# Patient Record
Sex: FEMALE | Race: WHITE | NOT HISPANIC OR LATINO | Employment: UNEMPLOYED | URBAN - METROPOLITAN AREA
[De-identification: names, ages, dates, MRNs, and addresses within clinical notes are randomized per-mention and may not be internally consistent; named-entity substitution may affect disease eponyms.]

---

## 2017-06-13 ENCOUNTER — TELEPHONE (OUTPATIENT)
Dept: OBSTETRICS AND GYNECOLOGY | Facility: CLINIC | Age: 22
End: 2017-06-13

## 2017-06-13 NOTE — TELEPHONE ENCOUNTER
----- Message from Jennie García MA sent at 6/13/2017 10:25 AM EDT -----  Contact: pt  Peg,    She had an US with [facility], did not show an IUD in place. If she is not having any problems, I would suggest her keep her scheduled appt.    Jennie  ----- Message -----     From: Peg Chávez     Sent: 6/12/2017  12:03 PM       To: Jennie García MA    OE Patient called stating she was seen at [facility] rec) due to abdominal pain and was diagnosed being pregnant but is unsure if she still has the Mirena. Can we see her sooner or is it okay to wait till her upcoming appt? Please advise. Pt # [phone]

## 2017-06-23 ENCOUNTER — INITIAL PRENATAL (OUTPATIENT)
Dept: OBSTETRICS AND GYNECOLOGY | Facility: CLINIC | Age: 22
End: 2017-06-23

## 2017-06-23 VITALS
SYSTOLIC BLOOD PRESSURE: 123 MMHG | HEIGHT: 64 IN | WEIGHT: 234 LBS | BODY MASS INDEX: 39.95 KG/M2 | DIASTOLIC BLOOD PRESSURE: 71 MMHG

## 2017-06-23 DIAGNOSIS — O26.841 UTERINE SIZE DATE DISCREPANCY PREGNANCY, FIRST TRIMESTER: ICD-10-CM

## 2017-06-23 DIAGNOSIS — O34.219 PREVIOUS CESAREAN DELIVERY, ANTEPARTUM: ICD-10-CM

## 2017-06-23 DIAGNOSIS — Z78.9 VARICELLA VACCINATION STATUS UNKNOWN: ICD-10-CM

## 2017-06-23 DIAGNOSIS — Z11.3 SCREEN FOR STD (SEXUALLY TRANSMITTED DISEASE): ICD-10-CM

## 2017-06-23 DIAGNOSIS — O23.41 UTI IN PREGNANCY, ANTEPARTUM, FIRST TRIMESTER: ICD-10-CM

## 2017-06-23 DIAGNOSIS — Z12.4 SCREENING FOR CERVICAL CANCER: ICD-10-CM

## 2017-06-23 DIAGNOSIS — O99.331 TOBACCO USE IN PREGNANCY, ANTEPARTUM, FIRST TRIMESTER: ICD-10-CM

## 2017-06-23 DIAGNOSIS — O99.211 OBESITY IN PREGNANCY, ANTEPARTUM, FIRST TRIMESTER: ICD-10-CM

## 2017-06-23 DIAGNOSIS — Z13.71 SCREENING FOR GENETIC DISEASE CARRIER STATUS: ICD-10-CM

## 2017-06-23 DIAGNOSIS — Z34.81 ENCOUNTER FOR SUPERVISION OF OTHER NORMAL PREGNANCY IN FIRST TRIMESTER: Primary | ICD-10-CM

## 2017-06-23 DIAGNOSIS — O21.0 HYPEREMESIS GRAVIDARUM: ICD-10-CM

## 2017-06-23 PROCEDURE — 99406 BEHAV CHNG SMOKING 3-10 MIN: CPT | Performed by: OBSTETRICS & GYNECOLOGY

## 2017-06-23 PROCEDURE — 99214 OFFICE O/P EST MOD 30 MIN: CPT | Performed by: OBSTETRICS & GYNECOLOGY

## 2017-06-23 RX ORDER — NITROFURANTOIN 25; 75 MG/1; MG/1
100 CAPSULE ORAL 2 TIMES DAILY
Qty: 14 CAPSULE | Refills: 0 | Status: SHIPPED | OUTPATIENT
Start: 2017-06-23 | End: 2017-06-30

## 2017-06-23 RX ORDER — PROMETHAZINE HYDROCHLORIDE 25 MG/1
25 TABLET ORAL EVERY 6 HOURS PRN
Qty: 30 TABLET | Refills: 2 | Status: SHIPPED | OUTPATIENT
Start: 2017-06-23 | End: 2017-11-17

## 2017-06-23 RX ORDER — MAGNESIUM HYDROXIDE 400 MG/5ML
1 SUSPENSION, ORAL (FINAL DOSE FORM) ORAL DAILY
Qty: 30 CAPSULE | Refills: 11 | Status: SHIPPED | OUTPATIENT
Start: 2017-06-23 | End: 2017-07-23

## 2017-06-23 RX ORDER — DOXYLAMINE SUCCINATE AND PYRIDOXINE HYDROCHLORIDE, DELAYED RELEASE TABLETS 10 MG/10 MG 10; 10 MG/1; MG/1
2 TABLET, DELAYED RELEASE ORAL NIGHTLY PRN
Qty: 60 TABLET | Refills: 3 | Status: SHIPPED | OUTPATIENT
Start: 2017-06-23 | End: 2017-11-17

## 2017-06-23 NOTE — PROGRESS NOTES
Initial ob visit     CC- Here for care of pregnancy     Liz Butcher is being seen today for her first obstetrical visit.  She is a 22 y.o.    11w5d gestation.     #: 1, Date: 13, Sex: Male, Weight: 8 lb 7 oz (3.827 kg), GA: 39w0d, Delivery: , Unspecified, Apgar1: None, Apgar5: None, Living: Yes, Birth Comments: None    #: 2, Date: 06/05/15, Sex: Female, Weight: 8 lb 3 oz (3.714 kg), GA: 39w0d, Delivery: , Unspecified, Apgar1: None, Apgar5: None, Living: Yes, Birth Comments: None    #: 3, Date: None, Sex: None, Weight: None, GA: None, Delivery: None, Apgar1: None, Apgar5: None, Living: None, Birth Comments: None      Current obstetric complaints : none   Prior obstetric issues, potential pregnancy concerns:  x 2  Family history of genetic issues (includes FOB): none  Prior infections concerning in pregnancy (Rash, fever in last 2 weeks): none  Varicella Hx -negative history  Prior testing for Cystic Fibrosis Carrier or Sickle Cell Trait- unknown status  Prepregnancy BMI - Body mass index is 40.17 kg/(m^2).    History reviewed. No pertinent past medical history.    Past Surgical History:   Procedure Laterality Date   •  SECTION     • WISDOM TOOTH EXTRACTION         No current outpatient prescriptions on file.    No Known Allergies    Social History     Social History   • Marital status: Single     Spouse name: N/A   • Number of children: N/A   • Years of education: N/A     Occupational History   • Not on file.     Social History Main Topics   • Smoking status: Current Some Day Smoker     Types: Cigarettes   • Smokeless tobacco: Never Used   • Alcohol use No   • Drug use: No   • Sexual activity: Yes     Partners: Male     Other Topics Concern   • Not on file     Social History Narrative   • No narrative on file       Family History   Problem Relation Age of Onset   • Breast cancer Neg Hx    • Ovarian cancer Neg Hx    • Uterine cancer Neg Hx    • Colon cancer Neg Hx   "  • Deep vein thrombosis Neg Hx    • Pulmonary embolism Neg Hx        Review of systems     A comprehensive review of systems was negative.     Objective    /71  Ht 64\" (162.6 cm)  Wt 234 lb (106 kg)  LMP 2017 (Exact Date)  BMI 40.17 kg/m2      General Appearance:    Alert, cooperative, in no acute distress, habitus obese   Head:    Normocephalic, without obvious abnormality, atraumatic   Eyes:            Lids and lashes normal, conjunctivae and sclerae normal, no   icterus, no pallor, corneas clear   Ears:    Ears appear intact with no abnormalities noted       Neck:   No adenopathy, supple, trachea midline, no thyromegaly   Back:     No kyphosis present, no scoliosis present,                       Lungs:     Clear to auscultation,respirations regular, even and                   unlabored    Heart:    Regular rhythm and normal rate, normal S1 and S2, no            murmur, no gallop, no rub, no click   Breast Exam:    No masses, No nipple discharge   Abdomen:     Normal bowel sounds, no masses, no organomegaly, soft        non-tender, non-distended, no guarding, no rebound                 tenderness   Genitalia:    Vulva - BUS-WNL, NEFG    Vagina - No discharge, No bleeding    Cervix - No Lesions, closed     Uterus - Consistent with 9 weeks    Adnexa - No vicente, NT    Pelvimetry - clinically adequate, gynecoid pelvis     Extremities:   Moves all extremities well, no edema, no cyanosis, no              redness   Pulses:   Pulses palpable and equal bilaterally   Skin:   No bleeding, bruising or rash   Lymph nodes:   No palpable adenopathy   Neurologic:   Sensation intact, A&O times 3      Assessment    1) Pregnancy at 11w5d  2) Obesity in pregnancy   Limit weight gain to 10-20 # overall  3) Tobacco use in pregnancy  I spent > 3 minutes counseling to stop smoking for the health and wellbeing of her pregnancy   4) Previous   Repeat at delivery   5) Hyperemesis, Rx sent        Plan    Initial " labs drawn, GC/CHL screen done  Patient is on Prenatal vitamins  Problem list reviewed and updated.  Reviewed routine prenatal care with the office to include but not limited to   Zika (travel restrictions/ok to use insect repellant), not to changing cat litter, food restrictions, avoidance of alcohol, tobacco and drugs and saunas/hot tubs.   All questions answered.     Uziel Fletcher MD   6/23/2017  9:58 AM

## 2017-06-24 LAB
ABO GROUP BLD: (no result)
BASOPHILS # BLD AUTO: 0 X10E3/UL (ref 0–0.2)
BASOPHILS NFR BLD AUTO: 0 %
BLD GP AB SCN SERPL QL: NEGATIVE
EOSINOPHIL # BLD AUTO: 0.1 X10E3/UL (ref 0–0.4)
EOSINOPHIL NFR BLD AUTO: 2 %
ERYTHROCYTE [DISTWIDTH] IN BLOOD BY AUTOMATED COUNT: 13.8 % (ref 12.3–15.4)
HBV SURFACE AG SERPL QL IA: NEGATIVE
HCT VFR BLD AUTO: 38.2 % (ref 34–46.6)
HGB BLD-MCNC: 12.9 G/DL (ref 11.1–15.9)
HIV 1+2 AB+HIV1 P24 AG SERPL QL IA: NON REACTIVE
IMM GRANULOCYTES # BLD: 0 X10E3/UL (ref 0–0.1)
IMM GRANULOCYTES NFR BLD: 0 %
LYMPHOCYTES # BLD AUTO: 1.7 X10E3/UL (ref 0.7–3.1)
LYMPHOCYTES NFR BLD AUTO: 24 %
MCH RBC QN AUTO: 28.6 PG (ref 26.6–33)
MCHC RBC AUTO-ENTMCNC: 33.8 G/DL (ref 31.5–35.7)
MCV RBC AUTO: 85 FL (ref 79–97)
MONOCYTES # BLD AUTO: 0.5 X10E3/UL (ref 0.1–0.9)
MONOCYTES NFR BLD AUTO: 8 %
NEUTROPHILS # BLD AUTO: 4.8 X10E3/UL (ref 1.4–7)
NEUTROPHILS NFR BLD AUTO: 66 %
PLATELET # BLD AUTO: 244 X10E3/UL (ref 150–379)
RBC # BLD AUTO: 4.51 X10E6/UL (ref 3.77–5.28)
RH BLD: POSITIVE
RPR SER QL: NON REACTIVE
RUBV IGG SERPL IA-ACNC: 1.11 INDEX
VZV IGG SER IA-ACNC: 283 INDEX
WBC # BLD AUTO: 7.2 X10E3/UL (ref 3.4–10.8)

## 2017-06-25 LAB
BACTERIA UR CULT: NORMAL
BACTERIA UR CULT: NORMAL

## 2017-06-27 LAB
C TRACH RRNA SPEC QL NAA+PROBE: NEGATIVE
N GONORRHOEA RRNA SPEC QL NAA+PROBE: NEGATIVE
T VAGINALIS RRNA SPEC QL NAA+PROBE: NEGATIVE

## 2017-06-28 LAB
CONV .: NORMAL
CYTOLOGIST CVX/VAG CYTO: NORMAL
CYTOLOGY CVX/VAG DOC THIN PREP: NORMAL
DX ICD CODE: NORMAL
DX ICD CODE: NORMAL
HIV 1 & 2 AB SER-IMP: NORMAL
Lab: NORMAL
OTHER STN SPEC: NORMAL
PATH REPORT.FINAL DX SPEC: NORMAL
STAT OF ADQ CVX/VAG CYTO-IMP: NORMAL

## 2017-06-30 ENCOUNTER — PROCEDURE VISIT (OUTPATIENT)
Dept: OBSTETRICS AND GYNECOLOGY | Facility: CLINIC | Age: 22
End: 2017-06-30

## 2017-06-30 DIAGNOSIS — Z34.91 UNCERTAIN DATES, ANTEPARTUM, FIRST TRIMESTER: Primary | ICD-10-CM

## 2017-06-30 LAB
CFTR MUT ANL BLD/T: NORMAL
CONV COMMENT: NORMAL

## 2017-06-30 PROCEDURE — 76817 TRANSVAGINAL US OBSTETRIC: CPT | Performed by: OBSTETRICS & GYNECOLOGY

## 2017-07-24 ENCOUNTER — TELEPHONE (OUTPATIENT)
Dept: OBSTETRICS AND GYNECOLOGY | Facility: CLINIC | Age: 22
End: 2017-07-24

## 2017-08-03 ENCOUNTER — TELEPHONE (OUTPATIENT)
Dept: OBSTETRICS AND GYNECOLOGY | Facility: CLINIC | Age: 22
End: 2017-08-03

## 2017-11-16 ENCOUNTER — TELEPHONE (OUTPATIENT)
Dept: OBSTETRICS AND GYNECOLOGY | Facility: CLINIC | Age: 22
End: 2017-11-16

## 2017-11-16 NOTE — TELEPHONE ENCOUNTER
Jennie,     We have seen before - so that is fine.     Thanks   Dr. Fletcher    ----- Message from Peg Chávez sent at 11/16/2017 10:33 AM EST -----  OB patient called stating she has recently moved back to [place] and is wanting to come back to see you. I have contacted her previous OB in [place] to get records. I scheduled her for tomorrow but patient is aware that you have to give the final okay for her to return.     Please advise    PT# is [phone]

## 2017-11-16 NOTE — TELEPHONE ENCOUNTER
Pt has an appt 11/17/2017. Pt advised to keep this appt. She did have 2 no-shows and no followup after we tried contacting her. Her pregnancy has been uneventful thus far.     Jennie

## 2017-11-17 ENCOUNTER — ROUTINE PRENATAL (OUTPATIENT)
Dept: OBSTETRICS AND GYNECOLOGY | Facility: CLINIC | Age: 22
End: 2017-11-17

## 2017-11-17 VITALS — BODY MASS INDEX: 41.2 KG/M2 | DIASTOLIC BLOOD PRESSURE: 74 MMHG | SYSTOLIC BLOOD PRESSURE: 128 MMHG | WEIGHT: 240 LBS

## 2017-11-17 DIAGNOSIS — Z34.83 ENCOUNTER FOR SUPERVISION OF OTHER NORMAL PREGNANCY IN THIRD TRIMESTER: Primary | ICD-10-CM

## 2017-11-17 DIAGNOSIS — O34.219 PREVIOUS CESAREAN DELIVERY, ANTEPARTUM: ICD-10-CM

## 2017-11-17 DIAGNOSIS — O99.210 OBESITY IN PREGNANCY, ANTEPARTUM: ICD-10-CM

## 2017-11-17 DIAGNOSIS — O23.43 UTI IN PREGNANCY, ANTEPARTUM, THIRD TRIMESTER: ICD-10-CM

## 2017-11-17 PROCEDURE — 99214 OFFICE O/P EST MOD 30 MIN: CPT | Performed by: OBSTETRICS & GYNECOLOGY

## 2017-11-17 RX ORDER — NITROFURANTOIN 25; 75 MG/1; MG/1
100 CAPSULE ORAL 2 TIMES DAILY
Qty: 14 CAPSULE | Refills: 0 | Status: SHIPPED | OUTPATIENT
Start: 2017-11-17 | End: 2017-11-24

## 2017-11-17 NOTE — PROGRESS NOTES
OB follow up     Liz Butcher is a 22 y.o.  31w1d being seen today for her obstetrical visit.  Patient reports no complaints. Fetal movement: normal.    Review of Systems  No bleeding, No cramping/contractions     Wt 240 lb (109 kg)  LMP 2017 (Exact Date)  BMI 41.2 kg/m2    FHT: 134 BPM   Uterine Size: size equals dates       Assessment    1) pregnancy at 31w1d   Rh+, BS good   2) UTI in pregnancy, treat with Macrobid, check culture -Nitrites pos, culture sent.  3) Prior    Repeat at 39 weeks  Sign papers  4) Obesity, good weight gain,   Check growth next visit.   5) Insufficient care  One visit with me, 2 outside, at 31+ weeks   Compliance stressed     I spent 25 of 30 minutes face to face with the patient, during this time we reviewed her medical diagnosis and our treatment plan as outlined above.       Plan    Reviewed this stage of pregnancy  Problem list updated   Follow up in 2 weeks    Uziel Fletcher MD   2017  12:01 PM

## 2017-11-19 LAB
BACTERIA UR CULT: NORMAL
BACTERIA UR CULT: NORMAL

## 2017-12-04 ENCOUNTER — ROUTINE PRENATAL (OUTPATIENT)
Dept: OBSTETRICS AND GYNECOLOGY | Facility: CLINIC | Age: 22
End: 2017-12-04

## 2017-12-04 ENCOUNTER — PROCEDURE VISIT (OUTPATIENT)
Dept: OBSTETRICS AND GYNECOLOGY | Facility: CLINIC | Age: 22
End: 2017-12-04

## 2017-12-04 VITALS — WEIGHT: 241 LBS | DIASTOLIC BLOOD PRESSURE: 75 MMHG | SYSTOLIC BLOOD PRESSURE: 118 MMHG | BODY MASS INDEX: 41.37 KG/M2

## 2017-12-04 DIAGNOSIS — O34.219 PREVIOUS CESAREAN DELIVERY, ANTEPARTUM: ICD-10-CM

## 2017-12-04 DIAGNOSIS — O36.63X0 EXCESSIVE FETAL GROWTH AFFECTING MANAGEMENT OF PREGNANCY IN THIRD TRIMESTER, SINGLE OR UNSPECIFIED FETUS: ICD-10-CM

## 2017-12-04 DIAGNOSIS — Z34.83 ENCOUNTER FOR SUPERVISION OF OTHER NORMAL PREGNANCY IN THIRD TRIMESTER: Primary | ICD-10-CM

## 2017-12-04 DIAGNOSIS — O26.843 UTERINE SIZE DATE DISCREPANCY PREGNANCY, THIRD TRIMESTER: Primary | ICD-10-CM

## 2017-12-04 PROCEDURE — 99213 OFFICE O/P EST LOW 20 MIN: CPT | Performed by: OBSTETRICS & GYNECOLOGY

## 2017-12-04 PROCEDURE — 76816 OB US FOLLOW-UP PER FETUS: CPT | Performed by: OBSTETRICS & GYNECOLOGY

## 2017-12-04 NOTE — PROGRESS NOTES
OB follow up     Liz Butcher is a 22 y.o.  33w4d being seen today for her obstetrical visit.  Patient reports no complaints. Fetal movement: normal.    Review of Systems  No bleeding, No cramping/contractions     /75  Wt 241 lb (109 kg)  LMP 2017 (Exact Date)  BMI 41.37 kg/m2    FHT: 143 BPM   Uterine Size: size equals dates       Assessment    1) pregnancy at 33w4d   2) LGA 69%, A/C 98%, Normal BIANCA and cephalic   3) Prior , for repeat at 39 weeks  4) insufficient care, followed up today as should have.         Plan    Reviewed this stage of pregnancy  Problem list updated   Follow up in 2 weeks    Uziel Fletcher MD   2017  3:51 PM

## 2017-12-08 ENCOUNTER — TELEPHONE (OUTPATIENT)
Dept: OBSTETRICS AND GYNECOLOGY | Facility: CLINIC | Age: 22
End: 2017-12-08

## 2017-12-09 ENCOUNTER — HOSPITAL ENCOUNTER (OUTPATIENT)
Facility: HOSPITAL | Age: 22
Setting detail: OBSERVATION
Discharge: HOME | End: 2017-12-09
Attending: OBSTETRICS & GYNECOLOGY | Admitting: OBSTETRICS & GYNECOLOGY

## 2017-12-09 VITALS
DIASTOLIC BLOOD PRESSURE: 44 MMHG | SYSTOLIC BLOOD PRESSURE: 119 MMHG | BODY MASS INDEX: 42.51 KG/M2 | WEIGHT: 249 LBS | RESPIRATION RATE: 16 BRPM | HEIGHT: 64 IN | HEART RATE: 80 BPM | TEMPERATURE: 98 F

## 2017-12-09 PROBLEM — Z34.90 PREGNANCY: Status: ACTIVE | Noted: 2017-12-09

## 2017-12-09 LAB
EXPIRATION DATE: NORMAL
Lab: NORMAL
PROT UR STRIP-MCNC: NEGATIVE MG/DL

## 2017-12-09 PROCEDURE — G0378 HOSPITAL OBSERVATION PER HR: HCPCS

## 2017-12-09 PROCEDURE — 96374 THER/PROPH/DIAG INJ IV PUSH: CPT

## 2017-12-09 PROCEDURE — 81002 URINALYSIS NONAUTO W/O SCOPE: CPT | Performed by: OBSTETRICS & GYNECOLOGY

## 2017-12-09 PROCEDURE — 59025 FETAL NON-STRESS TEST: CPT

## 2017-12-09 PROCEDURE — 59025 FETAL NON-STRESS TEST: CPT | Performed by: OBSTETRICS & GYNECOLOGY

## 2017-12-09 RX ORDER — FAMOTIDINE 10 MG/ML
20 INJECTION, SOLUTION INTRAVENOUS ONCE
Status: COMPLETED | OUTPATIENT
Start: 2017-12-09 | End: 2017-12-09

## 2017-12-09 RX ORDER — PROMETHAZINE HYDROCHLORIDE 25 MG/1
25 TABLET ORAL EVERY 6 HOURS PRN
COMMUNITY
End: 2017-12-18

## 2017-12-09 RX ORDER — PRENATAL VIT NO.126/IRON/FOLIC 28MG-0.8MG
1 TABLET ORAL DAILY
COMMUNITY
End: 2020-09-09

## 2017-12-09 RX ADMIN — FAMOTIDINE 20 MG: 10 INJECTION INTRAVENOUS at 03:11

## 2017-12-09 NOTE — NON STRESS TEST
Liz Butcher, a  at 34w2d with an EMELY of 2018, by Ultrasound, was seen at [facility] for a nonstress test.    Chief Complaint   Patient presents with   • Abdominal Pain     Patient presents to triage this morning via EMS, transferred from [facility] [place] [place], with c/o abdominal pain that started yesterday morning (2017). Patient states that it is intermittent and feels like pains she gets when she is constipated. Patient rates pain as a 9 or 10 on a 0-10 pain scale. Patient denies any LOF or vaginal bleeding and reports good fetal movement. Patients last bowel movement was 2017.   • Vomiting     Patient also c/o nausea and vomiting which started Monday (2017). She has not been able to keep anything down but ice chips.       Interpretation A  Nonstress Test Interpretation A: Reactive (17 0300 : Marielos Oreilly RN)

## 2017-12-09 NOTE — TELEPHONE ENCOUNTER
Received call from ED that patient presented to [facility] with complaints of contractions. States that she went there because she was uncertain her car would make it to [facility].  They have auscultated fetal heart tones on the baby and report her cervical exam is stable for transfer. They do not have continuous fetal monitoring. She had two prior  sections.  Will be transferred to ED for triage evaluation.  L&D notified.

## 2017-12-09 NOTE — NURSING NOTE
D/C instructions explained, signed and copy given. All questions answered and patient has no other questions or concerns at this time. Discussed signs and symptoms of when to return to triage, as well as instructed patient on fetal kick counts and log. Patient verbally understands. Patient states has all belongings.

## 2017-12-18 ENCOUNTER — ROUTINE PRENATAL (OUTPATIENT)
Dept: OBSTETRICS AND GYNECOLOGY | Facility: CLINIC | Age: 22
End: 2017-12-18

## 2017-12-18 VITALS — DIASTOLIC BLOOD PRESSURE: 66 MMHG | BODY MASS INDEX: 41.71 KG/M2 | SYSTOLIC BLOOD PRESSURE: 109 MMHG | WEIGHT: 243 LBS

## 2017-12-18 DIAGNOSIS — Z34.83 ENCOUNTER FOR SUPERVISION OF OTHER NORMAL PREGNANCY IN THIRD TRIMESTER: Primary | ICD-10-CM

## 2017-12-18 DIAGNOSIS — Z36.9 ANTENATAL SCREENING ENCOUNTER: ICD-10-CM

## 2017-12-18 DIAGNOSIS — O34.219 PREVIOUS CESAREAN DELIVERY, ANTEPARTUM: ICD-10-CM

## 2017-12-18 LAB — EXTERNAL GROUP B STREP ANTIGEN: NORMAL

## 2017-12-18 PROCEDURE — 99213 OFFICE O/P EST LOW 20 MIN: CPT | Performed by: OBSTETRICS & GYNECOLOGY

## 2017-12-18 NOTE — PROGRESS NOTES
Ob follow up    Liz Butcher is a 22 y.o.  35w4d patient being seen today for her obstetrical visit. Patient reports no complaints. Fetal movement: normal.      ROS - Denies leaking fluid, vaginal bleeding and notes good fetal movement.     /66  Wt 110 kg (243 lb)  LMP 2017 (Exact Date)  BMI 41.71 kg/m2    FHT:  154 BPM    Uterine Size: size equals dates   Presentations: cephalic   Pelvic Exam:     Dilation: Closed    Effacement: 25%    Station:  -2                 Assessment    1) Pregnancy at 35w4d  2) Fetal status reassuring   3) GBS status - done today   4) Prior  - repeat 18  5) LGA/insufficient care       Plan  GBS today  Labor warnings   [facility]        Uziel Fletcher MD   2017  3:14 PM

## 2017-12-22 LAB — B-HEM STREP SPEC QL CULT: NEGATIVE

## 2017-12-27 ENCOUNTER — ROUTINE PRENATAL (OUTPATIENT)
Dept: OBSTETRICS AND GYNECOLOGY | Facility: CLINIC | Age: 22
End: 2017-12-27

## 2017-12-27 VITALS — DIASTOLIC BLOOD PRESSURE: 72 MMHG | BODY MASS INDEX: 41.88 KG/M2 | WEIGHT: 244 LBS | SYSTOLIC BLOOD PRESSURE: 112 MMHG

## 2017-12-27 DIAGNOSIS — O34.219 PREVIOUS CESAREAN DELIVERY, ANTEPARTUM: ICD-10-CM

## 2017-12-27 DIAGNOSIS — Z34.83 ENCOUNTER FOR SUPERVISION OF OTHER NORMAL PREGNANCY IN THIRD TRIMESTER: Primary | ICD-10-CM

## 2017-12-27 PROCEDURE — 99213 OFFICE O/P EST LOW 20 MIN: CPT | Performed by: OBSTETRICS & GYNECOLOGY

## 2017-12-27 NOTE — PROGRESS NOTES
Ob follow up    Liz Butcher is a 22 y.o.  36w6d patient being seen today for her obstetrical visit. Patient reports heartburn. Fetal movement: normal.      ROS - Denies leaking fluid, vaginal bleeding and notes good fetal movement.     /72  Wt 111 kg (244 lb)  LMP 2017 (Exact Date)  BMI 41.88 kg/m2    FHT:  146 BPM    Uterine Size: size equals dates   Presentations: cephalic   Pelvic Exam:     Dilation: Closed    Effacement: 25%    Station:  -2                 Assessment    1) Pregnancy at 36w6d  2) Fetal status reassuring   3) GBS status - negative   4) prior  - repeat scheduled  5) LGA       Plan    Labor warnings   [facility]        Uziel Fletcher MD   2017  4:10 PM

## 2018-01-10 ENCOUNTER — ROUTINE PRENATAL (OUTPATIENT)
Dept: OBSTETRICS AND GYNECOLOGY | Facility: CLINIC | Age: 23
End: 2018-01-10

## 2018-01-10 VITALS — DIASTOLIC BLOOD PRESSURE: 84 MMHG | WEIGHT: 245 LBS | BODY MASS INDEX: 42.05 KG/M2 | SYSTOLIC BLOOD PRESSURE: 139 MMHG

## 2018-01-10 DIAGNOSIS — O36.63X0 EXCESSIVE FETAL GROWTH AFFECTING MANAGEMENT OF PREGNANCY IN THIRD TRIMESTER, SINGLE OR UNSPECIFIED FETUS: ICD-10-CM

## 2018-01-10 DIAGNOSIS — Z34.83 ENCOUNTER FOR SUPERVISION OF OTHER NORMAL PREGNANCY IN THIRD TRIMESTER: Primary | ICD-10-CM

## 2018-01-10 DIAGNOSIS — O34.219 PREVIOUS CESAREAN DELIVERY, ANTEPARTUM: ICD-10-CM

## 2018-01-10 PROCEDURE — 99213 OFFICE O/P EST LOW 20 MIN: CPT | Performed by: OBSTETRICS & GYNECOLOGY

## 2018-01-10 NOTE — PROGRESS NOTES
Ob follow up    Liz Butcher is a 22 y.o.  38w6d patient being seen today for her obstetrical visit. Patient reports heartburn. Fetal movement: normal.      ROS - Denies leaking fluid, vaginal bleeding and notes good fetal movement.     /84  Wt 111 kg (245 lb)  LMP 2017 (Exact Date)  BMI 42.05 kg/m2    FHT:  156 BPM    Uterine Size: 36 cm   Presentations: cephalic   Pelvic Exam:     Dilation: deferred    Effacement: deferred    Station:  deferred                 Assessment    1) Pregnancy at 38w6d  2) Fetal status reassuring   3) GBS status - negative   4) Prior , repeat for AM   5) LGA and Obesity, see above.     Plan    Labor warnings   [facility]        Uziel Fletcher MD   1/10/2018  1:04 PM

## 2018-01-11 ENCOUNTER — HOSPITAL ENCOUNTER (INPATIENT)
Facility: HOSPITAL | Age: 23
LOS: 2 days | Discharge: HOME | End: 2018-01-13
Attending: OBSTETRICS & GYNECOLOGY | Admitting: OBSTETRICS & GYNECOLOGY

## 2018-01-11 ENCOUNTER — ANESTHESIA EVENT (OUTPATIENT)
Dept: LABOR AND DELIVERY | Facility: HOSPITAL | Age: 23
End: 2018-01-11

## 2018-01-11 ENCOUNTER — ANESTHESIA (OUTPATIENT)
Dept: LABOR AND DELIVERY | Facility: HOSPITAL | Age: 23
End: 2018-01-11

## 2018-01-11 DIAGNOSIS — O34.219 PREVIOUS CESAREAN DELIVERY, ANTEPARTUM: ICD-10-CM

## 2018-01-11 DIAGNOSIS — Z30.2 REQUEST FOR STERILIZATION: ICD-10-CM

## 2018-01-11 LAB
ABO GROUP BLD: NORMAL
BASOPHILS # BLD AUTO: 0.03 10*3/MM3 (ref 0–0.2)
BASOPHILS NFR BLD AUTO: 0.3 % (ref 0–1.5)
BLD GP AB SCN SERPL QL: NEGATIVE
DEPRECATED RDW RBC AUTO: 43.7 FL (ref 37–54)
EOSINOPHIL # BLD AUTO: 0.09 10*3/MM3 (ref 0–0.7)
EOSINOPHIL NFR BLD AUTO: 1 % (ref 0.3–6.2)
ERYTHROCYTE [DISTWIDTH] IN BLOOD BY AUTOMATED COUNT: 14.9 % (ref 11.7–13)
HCT VFR BLD AUTO: 35 % (ref 35.6–45.5)
HGB BLD-MCNC: 11.2 G/DL (ref 11.9–15.5)
IMM GRANULOCYTES # BLD: 0.02 10*3/MM3 (ref 0–0.03)
IMM GRANULOCYTES NFR BLD: 0.2 % (ref 0–0.5)
LYMPHOCYTES # BLD AUTO: 2.27 10*3/MM3 (ref 0.9–4.8)
LYMPHOCYTES NFR BLD AUTO: 25.5 % (ref 19.6–45.3)
MCH RBC QN AUTO: 25.9 PG (ref 26.9–32)
MCHC RBC AUTO-ENTMCNC: 32 G/DL (ref 32.4–36.3)
MCV RBC AUTO: 80.8 FL (ref 80.5–98.2)
MONOCYTES # BLD AUTO: 0.71 10*3/MM3 (ref 0.2–1.2)
MONOCYTES NFR BLD AUTO: 8 % (ref 5–12)
NEUTROPHILS # BLD AUTO: 5.78 10*3/MM3 (ref 1.9–8.1)
NEUTROPHILS NFR BLD AUTO: 65 % (ref 42.7–76)
NRBC BLD MANUAL-RTO: 0 /100 WBC (ref 0–0)
PLATELET # BLD AUTO: 241 10*3/MM3 (ref 140–500)
PMV BLD AUTO: 11.9 FL (ref 6–12)
RBC # BLD AUTO: 4.33 10*6/MM3 (ref 3.9–5.2)
RH BLD: POSITIVE
WBC NRBC COR # BLD: 8.9 10*3/MM3 (ref 4.5–10.7)

## 2018-01-11 PROCEDURE — 0UB70ZZ EXCISION OF BILATERAL FALLOPIAN TUBES, OPEN APPROACH: ICD-10-PCS | Performed by: OBSTETRICS & GYNECOLOGY

## 2018-01-11 PROCEDURE — 25010000002 ONDANSETRON PER 1 MG: Performed by: ANESTHESIOLOGY

## 2018-01-11 PROCEDURE — 25010000003 MORPHINE PER 10 MG: Performed by: NURSE ANESTHETIST, CERTIFIED REGISTERED

## 2018-01-11 PROCEDURE — 25010000002 MORPHINE PER 10 MG: Performed by: NURSE ANESTHETIST, CERTIFIED REGISTERED

## 2018-01-11 PROCEDURE — 86901 BLOOD TYPING SEROLOGIC RH(D): CPT | Performed by: OBSTETRICS & GYNECOLOGY

## 2018-01-11 PROCEDURE — 25010000002 DIPHENHYDRAMINE PER 50 MG: Performed by: NURSE ANESTHETIST, CERTIFIED REGISTERED

## 2018-01-11 PROCEDURE — 86850 RBC ANTIBODY SCREEN: CPT | Performed by: OBSTETRICS & GYNECOLOGY

## 2018-01-11 PROCEDURE — 85025 COMPLETE CBC W/AUTO DIFF WBC: CPT | Performed by: OBSTETRICS & GYNECOLOGY

## 2018-01-11 PROCEDURE — 88302 TISSUE EXAM BY PATHOLOGIST: CPT | Performed by: OBSTETRICS & GYNECOLOGY

## 2018-01-11 PROCEDURE — 58611 LIGATE OVIDUCT(S) ADD-ON: CPT | Performed by: OBSTETRICS & GYNECOLOGY

## 2018-01-11 PROCEDURE — 86900 BLOOD TYPING SEROLOGIC ABO: CPT | Performed by: OBSTETRICS & GYNECOLOGY

## 2018-01-11 PROCEDURE — 59514 CESAREAN DELIVERY ONLY: CPT | Performed by: OBSTETRICS & GYNECOLOGY

## 2018-01-11 PROCEDURE — 25010000002 PHENYLEPHRINE PER 1 ML: Performed by: NURSE ANESTHETIST, CERTIFIED REGISTERED

## 2018-01-11 PROCEDURE — 25010000002 GENTAMICIN PER 80 MG: Performed by: OBSTETRICS & GYNECOLOGY

## 2018-01-11 RX ORDER — CARBOPROST TROMETHAMINE 250 UG/ML
250 INJECTION, SOLUTION INTRAMUSCULAR AS NEEDED
Status: DISCONTINUED | OUTPATIENT
Start: 2018-01-11 | End: 2018-01-13 | Stop reason: HOSPADM

## 2018-01-11 RX ORDER — MORPHINE SULFATE 1 MG/ML
INJECTION, SOLUTION EPIDURAL; INTRATHECAL; INTRAVENOUS AS NEEDED
Status: DISCONTINUED | OUTPATIENT
Start: 2018-01-11 | End: 2018-01-11 | Stop reason: SURG

## 2018-01-11 RX ORDER — SIMETHICONE 80 MG
80 TABLET,CHEWABLE ORAL 4 TIMES DAILY PRN
Status: DISCONTINUED | OUTPATIENT
Start: 2018-01-11 | End: 2018-01-13 | Stop reason: HOSPADM

## 2018-01-11 RX ORDER — OXYTOCIN-SODIUM CHLORIDE 0.9% IV SOLN 30 UNIT/500ML 30-0.9/5 UT/ML-%
SOLUTION INTRAVENOUS
Status: COMPLETED
Start: 2018-01-11 | End: 2018-01-11

## 2018-01-11 RX ORDER — DOCUSATE SODIUM 100 MG/1
100 CAPSULE, LIQUID FILLED ORAL 2 TIMES DAILY PRN
Status: DISCONTINUED | OUTPATIENT
Start: 2018-01-11 | End: 2018-01-13 | Stop reason: HOSPADM

## 2018-01-11 RX ORDER — METHYLERGONOVINE MALEATE 0.2 MG/ML
200 INJECTION INTRAVENOUS ONCE AS NEEDED
Status: DISCONTINUED | OUTPATIENT
Start: 2018-01-11 | End: 2018-01-13 | Stop reason: HOSPADM

## 2018-01-11 RX ORDER — OXYTOCIN-SODIUM CHLORIDE 0.9% IV SOLN 30 UNIT/500ML 30-0.9/5 UT/ML-%
999 SOLUTION INTRAVENOUS ONCE
Status: COMPLETED | OUTPATIENT
Start: 2018-01-11 | End: 2018-01-11

## 2018-01-11 RX ORDER — IBUPROFEN 800 MG/1
800 TABLET ORAL EVERY 8 HOURS PRN
Status: DISCONTINUED | OUTPATIENT
Start: 2018-01-11 | End: 2018-01-13 | Stop reason: HOSPADM

## 2018-01-11 RX ORDER — ACETAMINOPHEN 500 MG
1000 TABLET ORAL ONCE
Status: COMPLETED | OUTPATIENT
Start: 2018-01-11 | End: 2018-01-11

## 2018-01-11 RX ORDER — DIPHENHYDRAMINE HCL 25 MG
25 CAPSULE ORAL EVERY 4 HOURS PRN
Status: DISCONTINUED | OUTPATIENT
Start: 2018-01-11 | End: 2018-01-13 | Stop reason: HOSPADM

## 2018-01-11 RX ORDER — SODIUM CHLORIDE, SODIUM LACTATE, POTASSIUM CHLORIDE, CALCIUM CHLORIDE 600; 310; 30; 20 MG/100ML; MG/100ML; MG/100ML; MG/100ML
125 INJECTION, SOLUTION INTRAVENOUS CONTINUOUS
Status: DISCONTINUED | OUTPATIENT
Start: 2018-01-11 | End: 2018-01-11

## 2018-01-11 RX ORDER — MISOPROSTOL 200 UG/1
800 TABLET ORAL AS NEEDED
Status: DISCONTINUED | OUTPATIENT
Start: 2018-01-11 | End: 2018-01-13 | Stop reason: HOSPADM

## 2018-01-11 RX ORDER — MORPHINE SULFATE 0.5 MG/ML
INJECTION, SOLUTION EPIDURAL; INTRATHECAL; INTRAVENOUS AS NEEDED
Status: DISCONTINUED | OUTPATIENT
Start: 2018-01-11 | End: 2018-01-11 | Stop reason: SURG

## 2018-01-11 RX ORDER — SODIUM CHLORIDE 0.9 % (FLUSH) 0.9 %
1-10 SYRINGE (ML) INJECTION AS NEEDED
Status: DISCONTINUED | OUTPATIENT
Start: 2018-01-11 | End: 2018-01-11 | Stop reason: HOSPADM

## 2018-01-11 RX ORDER — HYDROMORPHONE HCL 110MG/55ML
PATIENT CONTROLLED ANALGESIA SYRINGE INTRAVENOUS
Status: DISCONTINUED
Start: 2018-01-11 | End: 2018-01-11 | Stop reason: WASHOUT

## 2018-01-11 RX ORDER — ERYTHROMYCIN 5 MG/G
OINTMENT OPHTHALMIC
Status: DISPENSED
Start: 2018-01-11 | End: 2018-01-11

## 2018-01-11 RX ORDER — HYDROMORPHONE HYDROCHLORIDE 1 MG/ML
0.5 INJECTION, SOLUTION INTRAMUSCULAR; INTRAVENOUS; SUBCUTANEOUS
Status: ACTIVE | OUTPATIENT
Start: 2018-01-11 | End: 2018-01-12

## 2018-01-11 RX ORDER — BUPIVACAINE HYDROCHLORIDE 7.5 MG/ML
INJECTION, SOLUTION EPIDURAL; RETROBULBAR AS NEEDED
Status: DISCONTINUED | OUTPATIENT
Start: 2018-01-11 | End: 2018-01-11 | Stop reason: SURG

## 2018-01-11 RX ORDER — PHYTONADIONE 2 MG/ML
INJECTION, EMULSION INTRAMUSCULAR; INTRAVENOUS; SUBCUTANEOUS
Status: DISPENSED
Start: 2018-01-11 | End: 2018-01-11

## 2018-01-11 RX ORDER — GLYCOPYRROLATE 0.2 MG/ML
INJECTION INTRAMUSCULAR; INTRAVENOUS AS NEEDED
Status: DISCONTINUED | OUTPATIENT
Start: 2018-01-11 | End: 2018-01-11 | Stop reason: SURG

## 2018-01-11 RX ORDER — MORPHINE SULFATE 2 MG/ML
2 INJECTION, SOLUTION INTRAMUSCULAR; INTRAVENOUS
Status: ACTIVE | OUTPATIENT
Start: 2018-01-11 | End: 2018-01-12

## 2018-01-11 RX ORDER — LANOLIN 100 %
OINTMENT (GRAM) TOPICAL
Status: DISCONTINUED | OUTPATIENT
Start: 2018-01-11 | End: 2018-01-13 | Stop reason: HOSPADM

## 2018-01-11 RX ORDER — HYDROXYZINE 50 MG/1
50 TABLET, FILM COATED ORAL EVERY 6 HOURS PRN
Status: DISCONTINUED | OUTPATIENT
Start: 2018-01-11 | End: 2018-01-13 | Stop reason: HOSPADM

## 2018-01-11 RX ORDER — MORPHINE SULFATE 1 MG/ML
INJECTION, SOLUTION EPIDURAL; INTRATHECAL; INTRAVENOUS
Status: COMPLETED
Start: 2018-01-11 | End: 2018-01-11

## 2018-01-11 RX ORDER — PRENATAL VIT NO.126/IRON/FOLIC 28MG-0.8MG
1 TABLET ORAL DAILY
Status: DISCONTINUED | OUTPATIENT
Start: 2018-01-11 | End: 2018-01-13 | Stop reason: HOSPADM

## 2018-01-11 RX ORDER — PROMETHAZINE HYDROCHLORIDE 25 MG/1
12.5 SUPPOSITORY RECTAL EVERY 6 HOURS PRN
Status: DISCONTINUED | OUTPATIENT
Start: 2018-01-11 | End: 2018-01-13 | Stop reason: HOSPADM

## 2018-01-11 RX ORDER — SODIUM CHLORIDE, SODIUM LACTATE, POTASSIUM CHLORIDE, CALCIUM CHLORIDE 600; 310; 30; 20 MG/100ML; MG/100ML; MG/100ML; MG/100ML
125 INJECTION, SOLUTION INTRAVENOUS CONTINUOUS
Status: DISCONTINUED | OUTPATIENT
Start: 2018-01-11 | End: 2018-01-13 | Stop reason: HOSPADM

## 2018-01-11 RX ORDER — OXYTOCIN-SODIUM CHLORIDE 0.9% IV SOLN 30 UNIT/500ML 30-0.9/5 UT/ML-%
125 SOLUTION INTRAVENOUS CONTINUOUS PRN
Status: COMPLETED | OUTPATIENT
Start: 2018-01-11 | End: 2018-01-11

## 2018-01-11 RX ORDER — PROMETHAZINE HYDROCHLORIDE 25 MG/1
25 TABLET ORAL EVERY 6 HOURS PRN
Status: DISCONTINUED | OUTPATIENT
Start: 2018-01-11 | End: 2018-01-13 | Stop reason: HOSPADM

## 2018-01-11 RX ORDER — DIPHENHYDRAMINE HYDROCHLORIDE 50 MG/ML
INJECTION INTRAMUSCULAR; INTRAVENOUS AS NEEDED
Status: DISCONTINUED | OUTPATIENT
Start: 2018-01-11 | End: 2018-01-11 | Stop reason: SURG

## 2018-01-11 RX ORDER — ONDANSETRON 2 MG/ML
4 INJECTION INTRAMUSCULAR; INTRAVENOUS ONCE AS NEEDED
Status: COMPLETED | OUTPATIENT
Start: 2018-01-11 | End: 2018-01-11

## 2018-01-11 RX ORDER — ONDANSETRON 2 MG/ML
4 INJECTION INTRAMUSCULAR; INTRAVENOUS ONCE AS NEEDED
Status: DISCONTINUED | OUTPATIENT
Start: 2018-01-11 | End: 2018-01-13 | Stop reason: HOSPADM

## 2018-01-11 RX ORDER — DIPHENHYDRAMINE HYDROCHLORIDE 50 MG/ML
25 INJECTION INTRAMUSCULAR; INTRAVENOUS EVERY 4 HOURS PRN
Status: DISCONTINUED | OUTPATIENT
Start: 2018-01-11 | End: 2018-01-13 | Stop reason: HOSPADM

## 2018-01-11 RX ORDER — NALOXONE HCL 0.4 MG/ML
0.2 VIAL (ML) INJECTION
Status: DISCONTINUED | OUTPATIENT
Start: 2018-01-11 | End: 2018-01-13 | Stop reason: HOSPADM

## 2018-01-11 RX ORDER — GENTAMICIN SULFATE 60 MG/50ML
1.5 INJECTION, SOLUTION INTRAVENOUS ONCE
Status: DISCONTINUED | OUTPATIENT
Start: 2018-01-11 | End: 2018-01-11

## 2018-01-11 RX ORDER — CLINDAMYCIN PHOSPHATE 900 MG/50ML
900 INJECTION INTRAVENOUS ONCE
Status: COMPLETED | OUTPATIENT
Start: 2018-01-11 | End: 2018-01-11

## 2018-01-11 RX ORDER — VANCOMYCIN HYDROCHLORIDE 1 G/200ML
1000 INJECTION, SOLUTION INTRAVENOUS ONCE
Status: DISCONTINUED | OUTPATIENT
Start: 2018-01-11 | End: 2018-01-11

## 2018-01-11 RX ORDER — PROMETHAZINE HYDROCHLORIDE 25 MG/ML
12.5 INJECTION, SOLUTION INTRAMUSCULAR; INTRAVENOUS EVERY 6 HOURS PRN
Status: DISCONTINUED | OUTPATIENT
Start: 2018-01-11 | End: 2018-01-13 | Stop reason: HOSPADM

## 2018-01-11 RX ORDER — ZOLPIDEM TARTRATE 5 MG/1
5 TABLET ORAL NIGHTLY PRN
Status: DISCONTINUED | OUTPATIENT
Start: 2018-01-11 | End: 2018-01-13 | Stop reason: HOSPADM

## 2018-01-11 RX ORDER — FAMOTIDINE 10 MG/ML
20 INJECTION, SOLUTION INTRAVENOUS ONCE AS NEEDED
Status: COMPLETED | OUTPATIENT
Start: 2018-01-11 | End: 2018-01-11

## 2018-01-11 RX ORDER — OXYCODONE HYDROCHLORIDE AND ACETAMINOPHEN 5; 325 MG/1; MG/1
2 TABLET ORAL EVERY 4 HOURS PRN
Status: DISCONTINUED | OUTPATIENT
Start: 2018-01-11 | End: 2018-01-13 | Stop reason: HOSPADM

## 2018-01-11 RX ADMIN — BUPIVACAINE HYDROCHLORIDE 1.6 ML: 7.5 INJECTION, SOLUTION EPIDURAL; RETROBULBAR at 09:44

## 2018-01-11 RX ADMIN — SODIUM CHLORIDE, POTASSIUM CHLORIDE, SODIUM LACTATE AND CALCIUM CHLORIDE 125 ML/HR: 600; 310; 30; 20 INJECTION, SOLUTION INTRAVENOUS at 11:21

## 2018-01-11 RX ADMIN — ONDANSETRON 4 MG: 2 INJECTION INTRAMUSCULAR; INTRAVENOUS at 09:21

## 2018-01-11 RX ADMIN — GENTAMICIN SULFATE 230 MG: 40 INJECTION, SOLUTION INTRAMUSCULAR; INTRAVENOUS at 09:16

## 2018-01-11 RX ADMIN — OXYCODONE HYDROCHLORIDE AND ACETAMINOPHEN 2 TABLET: 5; 325 TABLET ORAL at 14:10

## 2018-01-11 RX ADMIN — MORPHINE SULFATE 2 MG: 0.5 INJECTION EPIDURAL; INTRATHECAL; INTRAVENOUS at 10:32

## 2018-01-11 RX ADMIN — PHENYLEPHRINE HYDROCHLORIDE 100 MCG: 10 INJECTION INTRAVENOUS at 10:27

## 2018-01-11 RX ADMIN — SODIUM CHLORIDE, POTASSIUM CHLORIDE, SODIUM LACTATE AND CALCIUM CHLORIDE 125 ML/HR: 600; 310; 30; 20 INJECTION, SOLUTION INTRAVENOUS at 14:17

## 2018-01-11 RX ADMIN — OXYTOCIN 999 ML/HR: 10 INJECTION INTRAVENOUS at 10:08

## 2018-01-11 RX ADMIN — FAMOTIDINE 20 MG: 10 INJECTION, SOLUTION INTRAVENOUS at 09:21

## 2018-01-11 RX ADMIN — IBUPROFEN 800 MG: 800 TABLET ORAL at 14:10

## 2018-01-11 RX ADMIN — OXYTOCIN-SODIUM CHLORIDE 0.9% IV SOLN 30 UNIT/500ML 125 ML/HR: 30-0.9/5 SOLUTION at 11:27

## 2018-01-11 RX ADMIN — OXYCODONE HYDROCHLORIDE AND ACETAMINOPHEN 2 TABLET: 5; 325 TABLET ORAL at 21:12

## 2018-01-11 RX ADMIN — PHENYLEPHRINE HYDROCHLORIDE 100 MCG: 10 INJECTION INTRAVENOUS at 10:24

## 2018-01-11 RX ADMIN — SODIUM CHLORIDE, POTASSIUM CHLORIDE, SODIUM LACTATE AND CALCIUM CHLORIDE 1000 ML: 600; 310; 30; 20 INJECTION, SOLUTION INTRAVENOUS at 08:08

## 2018-01-11 RX ADMIN — IBUPROFEN 800 MG: 800 TABLET ORAL at 21:12

## 2018-01-11 RX ADMIN — PHENYLEPHRINE HYDROCHLORIDE 100 MCG: 10 INJECTION INTRAVENOUS at 10:15

## 2018-01-11 RX ADMIN — PHENYLEPHRINE HYDROCHLORIDE 100 MCG: 10 INJECTION INTRAVENOUS at 09:57

## 2018-01-11 RX ADMIN — PHENYLEPHRINE HYDROCHLORIDE 100 MCG: 10 INJECTION INTRAVENOUS at 10:04

## 2018-01-11 RX ADMIN — CLINDAMYCIN PHOSPHATE 900 MG: 900 INJECTION INTRAVENOUS at 09:49

## 2018-01-11 RX ADMIN — Medication: at 21:12

## 2018-01-11 RX ADMIN — MORPHINE SULFATE 0.1 MG: 1 INJECTION EPIDURAL; INTRATHECAL; INTRAVENOUS at 09:44

## 2018-01-11 RX ADMIN — DIPHENHYDRAMINE HYDROCHLORIDE 12.5 MG: 50 INJECTION INTRAMUSCULAR; INTRAVENOUS at 10:36

## 2018-01-11 RX ADMIN — PHENYLEPHRINE HYDROCHLORIDE 100 MCG: 10 INJECTION INTRAVENOUS at 10:30

## 2018-01-11 RX ADMIN — OXYTOCIN 125 ML/HR: 10 INJECTION, SOLUTION INTRAMUSCULAR; INTRAVENOUS at 11:27

## 2018-01-11 RX ADMIN — GLYCOPYRROLATE 0.2 MG: 0.2 INJECTION INTRAMUSCULAR; INTRAVENOUS at 10:00

## 2018-01-11 RX ADMIN — SODIUM CHLORIDE, POTASSIUM CHLORIDE, SODIUM LACTATE AND CALCIUM CHLORIDE 125 ML/HR: 600; 310; 30; 20 INJECTION, SOLUTION INTRAVENOUS at 08:38

## 2018-01-11 RX ADMIN — ACETAMINOPHEN 1000 MG: 500 TABLET ORAL at 09:21

## 2018-01-11 NOTE — PLAN OF CARE
Problem: Patient Care Overview (Adult)  Goal: Plan of Care Review  Outcome: Ongoing (interventions implemented as appropriate)   18 1144   Coping/Psychosocial Response Interventions   Plan Of Care Reviewed With patient;significant other   Patient Care Overview   Progress improving   Outcome Evaluation   Outcome Summary/Follow up Plan pt in recovery post  and tubal. pt denies pain at this time and is currently attempting to breasstfeed      Goal: Adult Individualization and Mutuality  Outcome: Ongoing (interventions implemented as appropriate)      Problem: Postpartum ( Delivery) (Adult)  Goal: Signs and Symptoms of Listed Potential Problems Will be Absent or Manageable (Postpartum)  Outcome: Ongoing (interventions implemented as appropriate)      Problem: Fall Risk  (Adult,Obstetrics,Pediatric)  Goal: Identify Related Risk Factors and Signs and Symptoms  Outcome: Ongoing (interventions implemented as appropriate)    Goal: Absence of Maternal Fall  Outcome: Ongoing (interventions implemented as appropriate)    Goal: Absence of [place] Fall/Drop  Outcome: Ongoing (interventions implemented as appropriate)

## 2018-01-11 NOTE — ANESTHESIA PREPROCEDURE EVALUATION
Anesthesia Evaluation     NPO Solid Status: > 8 hours       Airway   Mallampati: II  no difficulty expected  Dental      Pulmonary - normal exam   (+) a smoker Current Abstained day of surgery,     ROS comment: Patient counseled to stop smoking.    Cardiovascular - normal exam        Neuro/Psych  GI/Hepatic/Renal/Endo    (-) diabetes    Musculoskeletal     Abdominal    Substance History      OB/GYN    (+) Pregnant,   (-) Preeclampsia and history of pregnancy induced hypertension        Other        (-) blood dyscrasia                                          Anesthesia Plan    ASA 2     spinal     Anesthetic plan and risks discussed with patient.

## 2018-01-11 NOTE — OP NOTE
Section & Sterilization Full Procedure Note    Indications: previous uterine incision LTCS    Pre-operative Diagnosis: 1) Pregnancy at 39w0d             2) Previous                                               3) Desires sterilization                                              4) Obesity and Non-compliant care    Post-operative Diagnosis: same    Surgeon: Uziel Fletcher MD     Assistants: Slava Williamson MD     Anesthesia: Spinal anesthesia    ASA Class: per anesthesia        Procedure Details   The patient was seen in the Holding Room. The risks, benefits, complications, treatment options, and expected outcomes were discussed with the patient.  The patient concurred with the proposed plan, giving informed consent.  The site of surgery properly noted/marked. The patient was taken to Operating Room # 2, identified as Liz Butcher and the procedure verified as  Delivery. A Time Out was held and the above information confirmed.    After induction of anesthesia, the patient was draped and prepped in the usual sterile manner. A Pfannenstiel incision was made and carried down through the subcutaneous tissue to the fascia. Fascial incision was made and extended transversely. The fascia was  from the underlying rectus tissue superiorly and inferiorly. The peritoneum was identified and entered. Peritoneal incision was extended longitudinally.  A low transverse uterine incision was made. Delivered from cephalic presentation was a 7#10oz, fenake with Apgar scores of 8 at one minute and 8 at five minutes. Nuchal cord was noted times one and reduced.  After the umbilical cord was clamped and cut cord blood was obtained for evaluation. The placenta was removed intact and appeared normal. The uterine outline, tubes and ovaries appeared normal. The uterine incision was closed with running locked sutures of 0 Monocryl. A second layer of 0 Monocryl was used in an imbricating fashion.  Right tube was elevated and a window created in the mesosalpinx.   Then 2-0 silk ties were used to isolate a section of right tube 2-3 cm long. The interval segment was removed and ends of the tube cauterized.  The same procedure was performed on the contralateral side with similar results.   Hemostasis was observed. Lavage was carried out until clear. The peritoneum was closed with 3-0 Monocryl in a runnig fashion. The fascia was then reapproximated with running sutures of 0 Vicryl. The skin was reapproximated with 4-0 Vicryl in a subcuticular stitch.     Instrument, sponge, and needle counts were correct prior the abdominal closure and at the conclusion of the case.     Findings:  Normal pelvic anatomy     Estimated Blood Loss: 700 cc             Drains: goldsmith                   Specimens:   Order Name Source Comment Collection Info Order Time   TISSUE PATHOLOGY EXAM Fallopian Tube, Left  Collected By: Uziel Fletcher MD 1/11/2018 10:31 AM                    Complications:  None; patient tolerated the procedure well.           Disposition: PACU - hemodynamically stable.           Condition: stable    Uziel Fletcher MD  1/11/2018  10:50 AM

## 2018-01-11 NOTE — ANESTHESIA POSTPROCEDURE EVALUATION
"Patient: Liz Butcher    Procedure Summary     Date Anesthesia Start Anesthesia Stop Room / Location    18 0937 1051  [facility] /  [facility]       Procedure Diagnosis Surgeon Provider     SECTION REPEAT WITH TUBAL (Bilateral Abdomen) Previous  delivery, antepartum  (Previous  delivery, antepartum [O34.219]) MD Alberto Tello MD          Anesthesia Type: spinal  Last vitals  BP   103/55 (18 1105)   Temp   34.5 °C (94.1 °F) (18 1050)   Pulse   66 (18 1105)   Resp   16 (18 1105)     SpO2   98 % (18 1105)     Post Anesthesia Care and Evaluation    Patient location during evaluation: bedside  Patient participation: complete - patient participated  Level of consciousness: awake  Pain management: adequate  Airway patency: patent  Anesthetic complications: No anesthetic complications    Cardiovascular status: acceptable  Respiratory status: acceptable  Hydration status: acceptable    Comments: /55  Pulse 66  Temp 34.5 °C (94.1 °F) (Oral)   Resp 16  Ht 162.6 cm (64\")  Wt 111 kg (245 lb)  LMP 2017 (Exact Date)  SpO2 98%  Breastfeeding? Yes  BMI 42.05 kg/m2        "

## 2018-01-11 NOTE — LACTATION NOTE
P3 term baby. She reports latching difficulties with previous babies but says this baby latched x 45 min after delivery. Encouraged to call for any assist.

## 2018-01-11 NOTE — ANESTHESIA PROCEDURE NOTES
Spinal Block    Patient location during procedure: OR  Start Time: 1/11/2018 9:45 AM  Performed By  Anesthesiologist: MONA HINES  CRNA: ANTONIO BASHIR  Preanesthetic Checklist  Completed: patient identified, site marked, surgical consent, pre-op evaluation, timeout performed, IV checked, risks and benefits discussed and monitors and equipment checked  Spinal Block Prep:  Patient Position:sitting  Sterile Tech:cap, gloves and mask  Prep:Chloraprep  Patient Monitoring:blood pressure monitoring and continuous pulse oximetry  Spinal Block Procedure  Approach:midline  Guidance:palpation technique  Location:L3-L4  Needle Type:Willard  Needle Gauge:25 G  Placement of Spinal needle event:cerebrospinal fluid aspirated  Paresthesia: no  Fluid Appearance:clear  Post Assessment  Patient Tolerance:patient tolerated the procedure well with no apparent complications  Complications no

## 2018-01-11 NOTE — H&P
[facility]  Obstetric History and Physical    Chief Complaint   Patient presents with   • Scheduled      pt presents to l&d for repet  with tubal. denies pain, ctx, HA           Patient is a 22 y.o. female  currently at 39w0d, who presents with Scheduled repeat .    Her prenatal care was with Dr. Fletcher and was complicated by prior , obesity and non-compliant care.        External Prenatal Results         Pregnancy Outside Results - these were transcribed from office records.  See scanned records for details. Date Time   Hgb      Hct      ABO      Rh      Antibody Screen      Glucose Fasting GTT      Glucose Tolerance Test 1 hour      Glucose Tolerance Test 3 hour      Gonorrhea (discrete)      Chlamydia (discrete)      RPR      VDRL      Syphillis Antibody      Rubella      HBsAg      Herpes Simplex Virus PCR      Herpes Simplex VIrus Culture      HIV      Hep C RNA Quant PCR      Hep C Antibody      Urine Drug Screen      AFP      Group B Strep ^ NEG  17    GBS Susceptibility to Clindamycin      GBS Susceptibility to Eythromycin      Fetal Fibronectin      Genetic Testing, Maternal Blood             Legend: ^: Historical             Obstetric History       T2      L2     SAB0   TAB0   Ectopic0   Multiple0   Live Births2       # Outcome Date GA Lbr Alvin/2nd Weight Sex Delivery Anes PTL Lv   3 Current            2 Term 06/05/15 39w0d  3714 g (8 lb 3 oz) F CS-Unspec Spinal N JORGE   1 Term 13 39w0d  3827 g (8 lb 7 oz) M CS-Unspec Spinal N JORGE      Complications: LGA (large for gestational age) infant              Past Medical History:   Diagnosis Date   • Heart murmur           Past Surgical History:   Procedure Laterality Date   •  SECTION      x2   • WISDOM TOOTH EXTRACTION            No current facility-administered medications on file prior to encounter.      Current Outpatient Prescriptions on File Prior to Encounter   Medication Sig  "Dispense Refill   • Prenatal Vit-Fe Fumarate-FA (PRENATAL, CLASSIC, VITAMIN) 28-0.8 MG tablet tablet Take 1 tablet by mouth Daily.            Allergies   Allergen Reactions   • Penicillins Rash   • Amoxicillin           Social History     Social History   • Marital status: Single     Spouse name: N/A   • Number of children: N/A   • Years of education: N/A     Occupational History   • Not on file.     Social History Main Topics   • Smoking status: Current Some Day Smoker     Packs/day: 0.25     Years: 6.00     Types: Cigarettes   • Smokeless tobacco: Never Used   • Alcohol use No   • Drug use: No   • Sexual activity: Yes     Partners: Male     Other Topics Concern   • Not on file     Social History Narrative          Family History   Problem Relation Age of Onset   • Osteogenesis imperfecta Father    • Schizophrenia Mother    • Osteogenesis imperfecta Brother    • Osteogenesis imperfecta Son    • Heart murmur Daughter    • Cancer Paternal Grandmother    • Schizophrenia Maternal Grandmother    • Heart attack Maternal Grandfather    • Hypertension Maternal Grandfather    • Hyperlipidemia Maternal Grandfather    • Osteogenesis imperfecta Brother    • Osteogenesis imperfecta Brother    • No Known Problems Brother    • No Known Problems Brother    • No Known Problems Brother    • Melanoma Other         Review of Systems   Constitutional: Negative for chills and fever.   Gastrointestinal: Negative for abdominal pain.   Genitourinary: Negative for pelvic pain, vaginal bleeding and vaginal discharge.   All other systems reviewed and are negative.      /65  Pulse 74  Temp 97.9 °F (36.6 °C) (Oral)   Resp 16  Ht 162.6 cm (64\")  Wt 111 kg (245 lb)  LMP 04/02/2017 (Exact Date)  SpO2 99%  Breastfeeding? Yes  BMI 42.05 kg/m2    Physical Exam   Constitutional: She is oriented to person, place, and time. She appears well-developed and well-nourished.   HENT:   Head: Normocephalic and atraumatic.   Eyes: Conjunctivae " are normal.   Neck: Normal range of motion. Neck supple. No thyromegaly present.   Cardiovascular: Normal rate, regular rhythm and normal heart sounds.    No murmur heard.  Pulmonary/Chest: Effort normal and breath sounds normal.   Abdominal: Soft. Bowel sounds are normal.   Genitourinary: Uterus is enlarged (EFW 8# ).   Musculoskeletal: She exhibits no edema.   Neurological: She is alert and oriented to person, place, and time.   Skin: No rash noted.   Psychiatric: She has a normal mood and affect. Her behavior is normal.       Presentation: Cephalic      FHT - Reassuring, class 1  [place] - No labor     Lab Results   Component Value Date    WBC 8.90 2018    HGB 11.2 (L) 2018    HCT 35.0 (L) 2018    MCV 80.8 2018     2018       Principal Problem:    Previous  delivery, antepartum      Assessment & Plan    Assessment:  1.  Intrauterine pregnancy at 39w0d weeks gestation with reactive fetal status.    2.  Prior  - Repeat today   3. Desires sterilization.   R/B/A reviewed   4.  Non-compliant, initial visit with me, then two with outside providers in [facility] and then back to me at 31 weeks, improved since then.   5. Obesity   6.  GBS status: .negative     Plan:  1. Repeat  with sterilization   2. Plan of care has been reviewed with patient and family   3.  Risks, benefits of treatment plan have been discussed.  4.  All questions have been answered.        Uziel Fletcher MD  2018  8:31 AM

## 2018-01-12 LAB
BASOPHILS # BLD AUTO: 0.03 10*3/MM3 (ref 0–0.2)
BASOPHILS NFR BLD AUTO: 0.3 % (ref 0–1.5)
DEPRECATED RDW RBC AUTO: 45.7 FL (ref 37–54)
EOSINOPHIL # BLD AUTO: 0.2 10*3/MM3 (ref 0–0.7)
EOSINOPHIL NFR BLD AUTO: 2.3 % (ref 0.3–6.2)
ERYTHROCYTE [DISTWIDTH] IN BLOOD BY AUTOMATED COUNT: 14.8 % (ref 11.7–13)
HCT VFR BLD AUTO: 32.2 % (ref 35.6–45.5)
HGB BLD-MCNC: 9.8 G/DL (ref 11.9–15.5)
IMM GRANULOCYTES # BLD: 0.02 10*3/MM3 (ref 0–0.03)
IMM GRANULOCYTES NFR BLD: 0.2 % (ref 0–0.5)
LAB AP CASE REPORT: NORMAL
LYMPHOCYTES # BLD AUTO: 2.82 10*3/MM3 (ref 0.9–4.8)
LYMPHOCYTES NFR BLD AUTO: 32.9 % (ref 19.6–45.3)
Lab: NORMAL
MCH RBC QN AUTO: 25.5 PG (ref 26.9–32)
MCHC RBC AUTO-ENTMCNC: 30.4 G/DL (ref 32.4–36.3)
MCV RBC AUTO: 83.9 FL (ref 80.5–98.2)
MONOCYTES # BLD AUTO: 0.71 10*3/MM3 (ref 0.2–1.2)
MONOCYTES NFR BLD AUTO: 8.3 % (ref 5–12)
NEUTROPHILS # BLD AUTO: 4.8 10*3/MM3 (ref 1.9–8.1)
NEUTROPHILS NFR BLD AUTO: 56 % (ref 42.7–76)
PATH REPORT.FINAL DX SPEC: NORMAL
PATH REPORT.GROSS SPEC: NORMAL
PLATELET # BLD AUTO: 217 10*3/MM3 (ref 140–500)
PMV BLD AUTO: 11.1 FL (ref 6–12)
RBC # BLD AUTO: 3.84 10*6/MM3 (ref 3.9–5.2)
WBC NRBC COR # BLD: 8.58 10*3/MM3 (ref 4.5–10.7)

## 2018-01-12 PROCEDURE — 99232 SBSQ HOSP IP/OBS MODERATE 35: CPT | Performed by: OBSTETRICS & GYNECOLOGY

## 2018-01-12 PROCEDURE — 85025 COMPLETE CBC W/AUTO DIFF WBC: CPT | Performed by: OBSTETRICS & GYNECOLOGY

## 2018-01-12 RX ADMIN — DOCUSATE SODIUM 100 MG: 100 CAPSULE, LIQUID FILLED ORAL at 08:34

## 2018-01-12 RX ADMIN — DOCUSATE SODIUM 100 MG: 100 CAPSULE, LIQUID FILLED ORAL at 21:58

## 2018-01-12 RX ADMIN — OXYCODONE HYDROCHLORIDE AND ACETAMINOPHEN 2 TABLET: 5; 325 TABLET ORAL at 19:34

## 2018-01-12 RX ADMIN — POLYETHYLENE GLYCOL 3350 17 G: 17 POWDER, FOR SOLUTION ORAL at 08:34

## 2018-01-12 RX ADMIN — OXYCODONE HYDROCHLORIDE AND ACETAMINOPHEN 2 TABLET: 5; 325 TABLET ORAL at 10:11

## 2018-01-12 RX ADMIN — SIMETHICONE CHEW TAB 80 MG 80 MG: 80 TABLET ORAL at 08:34

## 2018-01-12 RX ADMIN — IBUPROFEN 800 MG: 800 TABLET ORAL at 14:38

## 2018-01-12 RX ADMIN — IBUPROFEN 800 MG: 800 TABLET ORAL at 06:09

## 2018-01-12 RX ADMIN — SIMETHICONE CHEW TAB 80 MG 80 MG: 80 TABLET ORAL at 14:38

## 2018-01-12 RX ADMIN — OXYCODONE HYDROCHLORIDE AND ACETAMINOPHEN 2 TABLET: 5; 325 TABLET ORAL at 06:09

## 2018-01-12 RX ADMIN — OXYCODONE HYDROCHLORIDE AND ACETAMINOPHEN 2 TABLET: 5; 325 TABLET ORAL at 14:38

## 2018-01-12 RX ADMIN — IBUPROFEN 800 MG: 800 TABLET ORAL at 22:51

## 2018-01-12 RX ADMIN — Medication 1 TABLET: at 08:34

## 2018-01-12 NOTE — PROGRESS NOTES
Continued Stay Note  [facility]     Patient Name: Liz Butcher  MRN: [id number]  Today's Date: 1/12/2018    Admit Date: 1/11/2018          Discharge Plan       01/12/18 0905    Case Management/Social Work Plan    Plan Follow for meconium    Additional Comments Mother:  Liz Butcher, MRN: [id number]; Infant:  Ty Butcher, MRN: [id number].  Consult for  insufficient prenatal care.  Spoke to Sydney with the [facility] hotline who stated that the mother does not have a current or active case with [facility].  Spoke to the mother's RN Teena and informed her to contact [facility] for any issues or concerns regarding the mother.  No urine drug screen was completed on the mother or the infant.  CCP will follow for meconium and will assist as needed.  ALIE Azul              Discharge Codes     None            ALIE Kaye

## 2018-01-12 NOTE — PROGRESS NOTES
Section Progress Note    Assessment/Plan     Status post  section: Doing well postoperatively.     Postpartum anemia: HgB 9.8 from 11.2, appropriate postop drop.  Hemodynamically stable.  Continue Iron on discharge    Rh status: Rh +  Rubella: immune   Gender: Female    Subjective     Postpartum Day 2:  Delivery    The patient feels well. The patient denies emotional concerns. Pain is well controlled with current medications. The baby iswell.The patient is ambulating well. The patient is tolerating a normal diet. Patient reports flatus.    Objective     Vital signs in last 24 hours:  Temp:  [94.6 °F (34.8 °C)-98.4 °F (36.9 °C)] 97.9 °F (36.6 °C)  Heart Rate:  [50-76] 76  Resp:  [16-18] 18  BP: ()/(55-72) 107/69      General:    alert, appears stated age and cooperative   CV: RRR, no m/r/g   Lungs: CTAB, no wheezes, no respiratory distress   Bowel Sounds:  active   Lochia:  appropriate   Uterine Fundus:   firm   Incision:  clean bandage in place   DVT Evaluation:  No evidence of DVT seen on physical exam.     Lab Results   Component Value Date    WBC 8.58 2018    HGB 9.8 (L) 2018    HCT 32.2 (L) 2018    MCV 83.9 2018     2018         Susie Ramirez MD  2018  10:50 AM

## 2018-01-12 NOTE — LACTATION NOTE
This note was copied from a baby's chart.  Patient brought her own Medela pump and has been given syringes and bottles . Baby has been latching very well but mom states baby has been going long stretches between feedings. Baby in Nursery now.

## 2018-01-13 VITALS
TEMPERATURE: 98.3 F | OXYGEN SATURATION: 94 % | BODY MASS INDEX: 41.83 KG/M2 | HEART RATE: 83 BPM | WEIGHT: 245 LBS | HEIGHT: 64 IN | RESPIRATION RATE: 18 BRPM | SYSTOLIC BLOOD PRESSURE: 120 MMHG | DIASTOLIC BLOOD PRESSURE: 83 MMHG

## 2018-01-13 PROCEDURE — 99238 HOSP IP/OBS DSCHRG MGMT 30/<: CPT | Performed by: OBSTETRICS & GYNECOLOGY

## 2018-01-13 RX ORDER — IBUPROFEN 800 MG/1
800 TABLET ORAL EVERY 8 HOURS PRN
Qty: 50 TABLET | Refills: 0 | Status: SHIPPED | OUTPATIENT
Start: 2018-01-13 | End: 2020-09-09

## 2018-01-13 RX ORDER — OXYCODONE HYDROCHLORIDE AND ACETAMINOPHEN 5; 325 MG/1; MG/1
2 TABLET ORAL EVERY 4 HOURS PRN
Qty: 30 TABLET | Refills: 0 | Status: SHIPPED | OUTPATIENT
Start: 2018-01-13 | End: 2018-01-21

## 2018-01-13 RX ADMIN — SIMETHICONE CHEW TAB 80 MG 80 MG: 80 TABLET ORAL at 08:30

## 2018-01-13 RX ADMIN — Medication 1 TABLET: at 08:30

## 2018-01-13 RX ADMIN — SIMETHICONE CHEW TAB 80 MG 80 MG: 80 TABLET ORAL at 15:17

## 2018-01-13 RX ADMIN — POLYETHYLENE GLYCOL 3350 17 G: 17 POWDER, FOR SOLUTION ORAL at 08:30

## 2018-01-13 RX ADMIN — IBUPROFEN 800 MG: 800 TABLET ORAL at 08:30

## 2018-01-13 RX ADMIN — OXYCODONE HYDROCHLORIDE AND ACETAMINOPHEN 2 TABLET: 5; 325 TABLET ORAL at 06:20

## 2018-01-13 RX ADMIN — OXYCODONE HYDROCHLORIDE AND ACETAMINOPHEN 2 TABLET: 5; 325 TABLET ORAL at 15:17

## 2018-01-13 RX ADMIN — DOCUSATE SODIUM 100 MG: 100 CAPSULE, LIQUID FILLED ORAL at 08:29

## 2018-01-13 RX ADMIN — Medication 1 APPLICATION: at 15:17

## 2018-01-13 NOTE — DISCHARGE SUMMARY
Date of Discharge:  2018    Discharge Diagnosis:   Patient Active Problem List   Diagnosis   • Previous  delivery, antepartum   • Pregnancy   • Postpartum care and examination immediately after delivery         Presenting Problem/History of Present Illness  Previous  delivery, antepartum [O34.219]       Hospital Course  Patient is a 22 y.o. female  39w0d presented with scheduled repeat.  For events surrounding her delivery please see delivery/op note.  Her postpartum course was uneventful and today POD#2, she is ready for discharge.  She meets all milestones and criteria for discharge and instructions were reviewed and she voiced understanding.     Procedures Performed  Procedure(s):   SECTION REPEAT WITH TUBAL       Consults:   Consults     No orders found from 2017 to 2018.          Pertinent Test Results: none    Condition on Discharge:  Stable     Discharge Disposition  Home or Self Care    Discharge Medications   Liz Butcher   [place] Medication Instructions [id number]    Printed on:18 0901   Medication Information                      ibuprofen (ADVIL,MOTRIN) 800 MG tablet  Take 1 tablet by mouth Every 8 (Eight) Hours As Needed for Mild Pain .             oxyCODONE-acetaminophen (PERCOCET) 5-325 MG per tablet  Take 2 tablets by mouth Every 4 (Four) Hours As Needed for Severe Pain  for up to 8 days.             Prenatal Vit-Fe Fumarate-FA (PRENATAL, CLASSIC, VITAMIN) 28-0.8 MG tablet tablet  Take 1 tablet by mouth Daily.                 Discharge Diet:   Diet Instructions     Advance Diet As Tolerated                     Activity at Discharge:   Activity Instructions     Other Instructions (Specify)       No driving or tub baths for 2 weeks, No heavy lifting and pelvic rest for 6 weeks        Pelvic Rest                     Follow-up Appointments  No future appointments.      Test Results Pending at Discharge       Uziel Fletcher,  MD  01/13/18  9:01 AM

## 2018-01-13 NOTE — PROGRESS NOTES
Section Progress Note    Assessment/Plan     Status post  section: Doing well postoperatively.     Discharge home with standard precautions and return to clinic in 4-6 weeks.  - Routine postpartum care  - mild anemia, asymptomatic     Rh status: A positive  Rubella: immune  Gender: female     Subjective     Postpartum Day 2:  Delivery    The patient feels well. The patient denies emotional concerns. Pain is well controlled with current medications. The baby is well.Urinary output is adequate. The patient is ambulating well. The patient is tolerating a normal diet. Patient reports flatus.    Objective     Vital signs in last 24 hours:  Temp:  [97.6 °F (36.4 °C)-98.3 °F (36.8 °C)] 98.3 °F (36.8 °C)  Heart Rate:  [79-85] 83  Resp:  [16-20] 18  BP: (116-124)/(76-83) 120/83      General:    alert, appears stated age and cooperative   Bowel Sounds:  active   Lochia:  appropriate   Uterine Fundus:   firm   Incision:  healing well, no significant drainage, no dehiscence, no significant erythema   DVT Evaluation:  No evidence of DVT seen on physical exam.     Lab Results   Component Value Date    WBC 8.58 2018    HGB 9.8 (L) 2018    HCT 32.2 (L) 2018    MCV 83.9 2018     2018         Uziel Fletcher MD  2018  8:58 AM

## 2018-01-13 NOTE — DISCHARGE INSTR - LAB
Make an appointment to be seen in the office in 2 weeks. Call for any concerns or questions before this.

## 2018-01-13 NOTE — LACTATION NOTE
Discharge today.  Wt loss and output wnl.  Infant went long stretch last night without feeding.  Discussed waking techniques.  Pt states nursing going better especially on right breast.  Infant currently latched to left breast deeply with good jaw rotation.  Encouraged pt she was doing well but needed to work to wake infant for feedings at least every 3 hours.  Pt does pump when infant not waking for feeds.  Pt to follow up in clinic as needed.  Denies further questions.

## 2018-01-22 NOTE — PROGRESS NOTES
Continued Stay Note  [facility]     Patient Name: Liz Butcher  MRN: [id number]  Today's Date: 1/22/2018    Admit Date: 1/11/2018          Discharge Plan       01/22/18 0954    Case Management/Social Work Plan    Additional Comments The infant's meconium resulted negative for all substances tested.  ALIE Azul              Discharge Codes     None        Expected Discharge Date and Time     Expected Discharge Date Expected Discharge Time    Jan 13, 2018             ALIE Kaye

## 2018-01-22 NOTE — PROGRESS NOTES
Case Management Discharge Note    Final Note: Home with infant.  ALIE Azul    Discharge Placement     No information found        Other: Other (Private auto)    Discharge Codes: 01  Discharge to home

## 2020-09-08 ENCOUNTER — TELEPHONE (OUTPATIENT)
Dept: OBSTETRICS AND GYNECOLOGY | Facility: CLINIC | Age: 25
End: 2020-09-08

## 2020-09-08 NOTE — TELEPHONE ENCOUNTER
Jennie,     Last seen 2.5 years ago with delivery.   So needs annual and problem visit.   Do you want to see if Leatha can start work up tomorrow?    Thanks  Dr. Fletcher

## 2020-09-08 NOTE — TELEPHONE ENCOUNTER
Dr. Fletcher    Patient called and stated she has had a pinkish like discharge going on for four days. She is due to start her cycle but hasn't started yet. She states this is abnormal for her and has been experiencing some period like cramps that are worse than normal. She was wanting to get in this week to be seen but there is nothing available.     Please advise, thank you.

## 2020-09-09 ENCOUNTER — OFFICE VISIT (OUTPATIENT)
Dept: OBSTETRICS AND GYNECOLOGY | Facility: CLINIC | Age: 25
End: 2020-09-09

## 2020-09-09 VITALS
WEIGHT: 239 LBS | BODY MASS INDEX: 39.82 KG/M2 | HEIGHT: 65 IN | DIASTOLIC BLOOD PRESSURE: 69 MMHG | SYSTOLIC BLOOD PRESSURE: 99 MMHG

## 2020-09-09 DIAGNOSIS — Z30.09 GENERAL COUNSELING AND ADVICE FOR CONTRACEPTIVE MANAGEMENT: ICD-10-CM

## 2020-09-09 DIAGNOSIS — Z01.419 WOMEN'S ANNUAL ROUTINE GYNECOLOGICAL EXAMINATION: ICD-10-CM

## 2020-09-09 DIAGNOSIS — N92.6 IRREGULAR PERIODS/MENSTRUAL CYCLES: Primary | ICD-10-CM

## 2020-09-09 DIAGNOSIS — N89.8 VAGINAL DISCHARGE: ICD-10-CM

## 2020-09-09 LAB
B-HCG UR QL: NEGATIVE
BILIRUB BLD-MCNC: NEGATIVE MG/DL
CLARITY, POC: CLEAR
COLOR UR: YELLOW
GLUCOSE UR STRIP-MCNC: NEGATIVE MG/DL
INTERNAL NEGATIVE CONTROL: NEGATIVE
INTERNAL POSITIVE CONTROL: POSITIVE
KETONES UR QL: NEGATIVE
LEUKOCYTE EST, POC: NEGATIVE
Lab: NORMAL
NITRITE UR-MCNC: NEGATIVE MG/ML
PH UR: 5 [PH] (ref 5–8)
PROT UR STRIP-MCNC: NEGATIVE MG/DL
RBC # UR STRIP: NEGATIVE /UL
SP GR UR: 1.03 (ref 1–1.03)
UROBILINOGEN UR QL: NORMAL

## 2020-09-09 PROCEDURE — 81025 URINE PREGNANCY TEST: CPT | Performed by: NURSE PRACTITIONER

## 2020-09-09 PROCEDURE — 99213 OFFICE O/P EST LOW 20 MIN: CPT | Performed by: NURSE PRACTITIONER

## 2020-09-09 PROCEDURE — 99395 PREV VISIT EST AGE 18-39: CPT | Performed by: NURSE PRACTITIONER

## 2020-09-09 NOTE — PROGRESS NOTES
"GYN Annual Exam     Chief Complaint   Patient presents with   • Gynecologic Exam     Last pap 17 negative   • Vaginal Discharge     Pt c/o pink vaginal discharge       HPI    Liz Butcher is a 25 y.o. female who presents for annual well woman exam with a problem.  She is sexually active. She is not using condoms with intercourse. Prior tubal ligation. Periods are irregular, lasting varies from 2-12 days days. Dysmenorrhea:varies from heavy to light, moderate cramping. No intermenstrual bleeding, spotting, or discharge. Performing SBE:occas    C/o pink discharge for 5 days, she denies dysuria, burning, or itching. C/o pelvic pain. C/o LLQ pain with arousal, but no pain with intercourse, pain started with tubal ligation 3 years ago, she has not been seen since last delivery in 2017. She only noticed the pink discharge after urination. Reports hx of UTI, states \"this is not the same\"    This is my first time meeting Liz Butcher    OB History        3    Para   3    Term   3       0    AB   0    Living   3       SAB   0    TAB   0    Ectopic   0    Molar   0    Multiple   0    Live Births   3                LMP- \"early last month\"  Last Pap-2017-negative  History of abnormal Pap smear: no  History of STD-denies  Family history of uterine, colon or ovarian cancer: no  Family history of breast cancer: no  Mammogram-not indicated  History of abnormal mammogram: no  Gardasil Vaccine: unsure    Past Medical History:   Diagnosis Date   • Heart murmur        Past Surgical History:   Procedure Laterality Date   •  SECTION      x2   •  SECTION WITH TUBAL Bilateral 2018    Procedure:  SECTION REPEAT WITH TUBAL;  Surgeon: Uziel Fletcher MD;  Location: [facility] LABOR DELIVERY;  Service:    • WISDOM TOOTH EXTRACTION         No current outpatient medications on file.    Allergies   Allergen Reactions   • Penicillins Rash   • Amoxicillin        Social History     Tobacco Use " "  • Smoking status: Current Some Day Smoker     Packs/day: 0.25     Years: 6.00     Pack years: 1.50     Types: Cigarettes   • Smokeless tobacco: Never Used   Substance Use Topics   • Alcohol use: No   • Drug use: No       Family History   Problem Relation Age of Onset   • Osteogenesis imperfecta Father    • Schizophrenia Mother    • Osteogenesis imperfecta Brother    • Osteogenesis imperfecta Son    • Heart murmur Daughter    • Cancer Paternal Grandmother    • Schizophrenia Maternal Grandmother    • Heart attack Maternal Grandfather    • Hypertension Maternal Grandfather    • Hyperlipidemia Maternal Grandfather    • Osteogenesis imperfecta Brother    • Osteogenesis imperfecta Brother    • No Known Problems Brother    • No Known Problems Brother    • No Known Problems Brother    • Melanoma Other        Review of Systems   Constitutional: Negative for chills and fever.   Gastrointestinal: Negative for abdominal distention, abdominal pain, nausea and vomiting.   Endocrine: Negative for cold intolerance and heat intolerance.   Genitourinary: Positive for pelvic pain and vaginal discharge. Negative for amenorrhea, breast discharge, breast lump, breast pain, difficulty urinating, dyspareunia, dysuria, frequency, menstrual problem, vaginal bleeding and vaginal pain.   Musculoskeletal: Negative for back pain and gait problem.   Skin: Negative for color change and rash.   Neurological: Negative for dizziness and headache.   Psychiatric/Behavioral: Negative for depressed mood.       BP 99/69   Ht 165.1 cm (65\")   Wt 108 kg (239 lb)   LMP 08/13/2020   Breastfeeding No   BMI 39.77 kg/m²     Physical Exam   Constitutional: She is oriented to person, place, and time. She appears well-developed and well-nourished.   HENT:   Head: Normocephalic and atraumatic.   Eyes: Pupils are equal, round, and reactive to light.   Neck: Normal range of motion. Neck supple. No thyromegaly present.   Cardiovascular: Normal rate. "   Pulmonary/Chest: Effort normal. No respiratory distress. Right breast exhibits no inverted nipple, no mass, no nipple discharge, no skin change and no tenderness. Left breast exhibits no inverted nipple, no mass, no nipple discharge, no skin change and no tenderness. No breast swelling, tenderness, discharge or bleeding. Breasts are symmetrical.   Bilateral nipple piercing's    Abdominal: Soft. She exhibits no distension and no mass. There is no tenderness. There is no rebound and no guarding. No hernia. Hernia confirmed negative in the right inguinal area and confirmed negative in the left inguinal area.   Genitourinary: Uterus normal. No labial fusion. There is no rash, tenderness, lesion or injury on the right labia. There is no rash, tenderness, lesion or injury on the left labia. Cervix exhibits no motion tenderness, no discharge and no friability. Right adnexum displays no mass, no tenderness and no fullness. Left adnexum displays no mass, no tenderness and no fullness. No erythema, tenderness or bleeding in the vagina. No foreign body in the vagina. No signs of injury around the vagina. No vaginal discharge found.   Musculoskeletal: Normal range of motion. She exhibits no edema.   Lymphadenopathy:     She has no cervical adenopathy. No inguinal adenopathy noted on the right or left side.   Neurological: She is alert and oriented to person, place, and time. No cranial nerve deficit. Coordination normal.   Skin: Skin is warm and dry. No erythema.   Psychiatric: She has a normal mood and affect. Her behavior is normal. Judgment and thought content normal.   Vitals reviewed.         Assessment     1. Annual Gyn Exam  2. Vaginal discharge    Plan   1. Well woman exam: Pap collected Yes. Recommend MVI daily.    2. Contraception: Prior tubal ligation  3. STD: Enc condoms. STD screen today- Yes. NuSwab  Smoking status: Liz Butcher  reports that she has been smoking cigarettes. She has a 1.50 pack-year smoking  history. She has never used smokeless tobacco.. I have educated her on the risk of diseases from using tobacco products such as cancer, COPD and heart diease. I advised her to quit and she is not willing to quit.I spent 3  minutes counseling the patient. She has been weaning off cigarettes, but is not ready to set a quit date  5.  Patient's Body mass index is 39.77 kg/m². BMI is above normal parameters. Recommendations include: exercise counseling and nutrition counseling.  6.    Encouraged annual mammogram screening starting at age 40. Instructed on how to perform SBE. Encouraged breast health self awareness.  7.    Encouraged 150 minutes of exercise per week if not medially contraindicated.   8.    Urine Hcg negative in office  9.   NuSwab+ today for c/o pink tinged vaginal discharge. Discussed with pt possible causes of pink discharge, no abnormal discharge noted on exam  10. Urine dip in office negative    Follow Up one year or PRN    Leatha Shelton, APRN  9/9/2020  09:52

## 2020-09-11 LAB
A VAGINAE DNA VAG QL NAA+PROBE: ABNORMAL SCORE
BVAB2 DNA VAG QL NAA+PROBE: ABNORMAL SCORE
C ALBICANS DNA VAG QL NAA+PROBE: NEGATIVE
C GLABRATA DNA VAG QL NAA+PROBE: NEGATIVE
C TRACH DNA VAG QL NAA+PROBE: NEGATIVE
CONV .: NORMAL
CYTOLOGIST CVX/VAG CYTO: NORMAL
CYTOLOGY CVX/VAG DOC CYTO: NORMAL
CYTOLOGY CVX/VAG DOC THIN PREP: NORMAL
DX ICD CODE: NORMAL
HIV 1 & 2 AB SER-IMP: NORMAL
MEGA1 DNA VAG QL NAA+PROBE: ABNORMAL SCORE
N GONORRHOEA DNA VAG QL NAA+PROBE: NEGATIVE
OTHER STN SPEC: NORMAL
STAT OF ADQ CVX/VAG CYTO-IMP: NORMAL
T VAGINALIS DNA VAG QL NAA+PROBE: NEGATIVE

## 2020-09-14 RX ORDER — METRONIDAZOLE 500 MG/1
500 TABLET ORAL 2 TIMES DAILY
Qty: 14 TABLET | Refills: 0 | Status: SHIPPED | OUTPATIENT
Start: 2020-09-14 | End: 2020-09-21

## 2024-08-21 ENCOUNTER — TELEPHONE (OUTPATIENT)
Dept: OBSTETRICS AND GYNECOLOGY | Facility: CLINIC | Age: 29
End: 2024-08-21

## 2024-08-21 NOTE — TELEPHONE ENCOUNTER
Caller:ALEXANDRE SCHAEFER    Relationship:      Best call back number:[phone]    PATIENT CALLED REQUESTING TO CANCEL SAME DAY APPT.    Did the patient call AFTER the start time of their scheduled appointment?   NO    Was the patient's appointment rescheduled?  YES

## (undated) DEVICE — SUT SILK 2/0 TIES 18IN A185H

## (undated) DEVICE — 3M™ STERI-STRIP™ COMPOUND BENZOIN TINCTURE 40 BAGS/CARTON 4 CARTONS/CASE C1544: Brand: 3M™ STERI-STRIP™

## (undated) DEVICE — SUT PLAIN  3/0 CT1 27IN 842H

## (undated) DEVICE — 3M™ STERI-STRIP™ REINFORCED ADHESIVE SKIN CLOSURES, R1547, 1/2 IN X 4 IN (12 MM X 100 MM), 6 STRIPS/ENVELOPE: Brand: 3M™ STERI-STRIP™

## (undated) DEVICE — UNDYED BRAIDED (POLYGLACTIN 910), SYNTHETIC ABSORBABLE SUTURE: Brand: COATED VICRYL

## (undated) DEVICE — GLV SURG BIOGEL LTX PF 7 1/2

## (undated) DEVICE — SUT MNCRYL 0/0 CTX 36IN Y398H

## (undated) DEVICE — SUT VIC 0 CT1 36IN J946H

## (undated) DEVICE — KENDALL SCD EXPRESS SLEEVES, KNEE LENGTH, MEDIUM: Brand: KENDALL SCD

## (undated) DEVICE — 3M™ MEDIPORE™ H SOFT CLOTH SURGICAL TAPE 2864, 4 INCH X 10 YARD (10CM X 9,14M), 12 ROLLS/CASE: Brand: 3M™ MEDIPORE™

## (undated) DEVICE — SOL IRR H2O BTL 1000ML STRL

## (undated) DEVICE — SUT MNCRYL 3/0 CT1 36 IN Y944H